# Patient Record
Sex: FEMALE | Race: WHITE | NOT HISPANIC OR LATINO | URBAN - METROPOLITAN AREA
[De-identification: names, ages, dates, MRNs, and addresses within clinical notes are randomized per-mention and may not be internally consistent; named-entity substitution may affect disease eponyms.]

---

## 2018-08-27 PROBLEM — Z00.00 ENCOUNTER FOR PREVENTIVE HEALTH EXAMINATION: Status: ACTIVE | Noted: 2018-08-27

## 2025-03-25 ENCOUNTER — EMERGENCY (EMERGENCY)
Facility: HOSPITAL | Age: 40
LOS: 1 days | Discharge: HOME | End: 2025-03-25
Admitting: EMERGENCY MEDICINE
Payer: MEDICAID

## 2025-03-25 VITALS
OXYGEN SATURATION: 98 % | TEMPERATURE: 97 F | RESPIRATION RATE: 18 BRPM | HEART RATE: 71 BPM | SYSTOLIC BLOOD PRESSURE: 146 MMHG | DIASTOLIC BLOOD PRESSURE: 106 MMHG | WEIGHT: 227.08 LBS

## 2025-03-25 VITALS
RESPIRATION RATE: 17 BRPM | HEART RATE: 69 BPM | TEMPERATURE: 98 F | OXYGEN SATURATION: 98 % | SYSTOLIC BLOOD PRESSURE: 136 MMHG | DIASTOLIC BLOOD PRESSURE: 71 MMHG

## 2025-03-25 PROCEDURE — 96372 THER/PROPH/DIAG INJ SC/IM: CPT

## 2025-03-25 PROCEDURE — 73564 X-RAY EXAM KNEE 4 OR MORE: CPT | Mod: 26,LT

## 2025-03-25 PROCEDURE — 99284 EMERGENCY DEPT VISIT MOD MDM: CPT | Mod: 25

## 2025-03-25 PROCEDURE — 93971 EXTREMITY STUDY: CPT | Mod: 26,LT

## 2025-03-25 PROCEDURE — 73564 X-RAY EXAM KNEE 4 OR MORE: CPT

## 2025-03-25 PROCEDURE — 99284 EMERGENCY DEPT VISIT MOD MDM: CPT

## 2025-03-25 PROCEDURE — 93971 EXTREMITY STUDY: CPT

## 2025-03-25 RX ORDER — KETOROLAC TROMETHAMINE 30 MG/ML
15 INJECTION, SOLUTION INTRAMUSCULAR; INTRAVENOUS ONCE
Refills: 0 | Status: DISCONTINUED | OUTPATIENT
Start: 2025-03-25 | End: 2025-03-25

## 2025-03-25 RX ADMIN — KETOROLAC TROMETHAMINE 15 MILLIGRAM(S): 30 INJECTION, SOLUTION INTRAMUSCULAR; INTRAVENOUS at 12:07

## 2025-03-25 RX ADMIN — KETOROLAC TROMETHAMINE 15 MILLIGRAM(S): 30 INJECTION, SOLUTION INTRAMUSCULAR; INTRAVENOUS at 13:05

## 2025-03-25 NOTE — ED ADULT TRIAGE NOTE - WEIGHT IN LBS
----- Message from Celena Boyd sent at 4/23/2018  9:15 AM EDT -----  Regarding: PLEASE RETURN CALL  Contact: [phone]  WENT TO ER.BREATHING DIFFICULTIES. BP /78,GOT BREATHING TREATMENTS, BUT WANTS ANOTHER BREATHING TREATMENT.WOULD RATHER TALK TO A NURSE TO SEE IF SHE SHOULD SCHEDULE NOW. PATIENT WAS CONFUSED WHEN I OFFERED  ER F/U APPT. CAN YOU PLEASE CALL HER AND SPEAK WITH HER, AND ADVISE APPT.[phone].THANKS,  CELENA   [phone]

## 2025-03-25 NOTE — ED PROVIDER NOTE - OBJECTIVE STATEMENT
40 F pmh arthritis, p/w LLE pain x this morning.  pt reports chronic L knee pain for which she is receiving gel injections in L knee for arthritis (last injection Nov 2024) and c/o worsening pain in L knee since this morning w/ radiation of pain down calf to foot; worse w/ ambulation.  occasional paresthesias in foot w/ certain movements knee.  no back pain or thigh pain.  uses cane for ambulation.  took excedrin w/o relief.  denies f/c, skin changes, chest pain, sob, calf swelling, h/o vte, exogenous hormones, recent travel/immobilization, trauma/fall.

## 2025-03-25 NOTE — ED PROVIDER NOTE - CHIEF COMPLAINT
[Time Spent: ___ minutes] : I have spent [unfilled] minutes of time on the encounter which excludes teaching and separately reported services. The patient is a 40y Female complaining of lower leg pain/injury.

## 2025-03-25 NOTE — ED ADULT NURSE NOTE - MUSCLE PAIN OR WEAKNESS
Caller would like to discuss a medication issue with a RN. Writer advised caller of callback within 24-72 hours.    Patient Name: Samaria A Watson  Caller Name: Elena, [employer]  Name of Facility: [facility]  Callback Number: [phone]  Fax Number:   Additional Info: Patient current needs a refill of her Solodyn medication.  Both Solodyn and it's generic, Minocycline, are not covered by patient's insurance.  Caller states one alternative that is covered is Dyacin.  If this is acceptable with the physician, please forward to patient's pharmacy (information below).    [facility] [address] [place] [address] [address] [place] [address] [address] [facility] [place] [facility] [place] [address]  Phone: [phone] Fax: [phone]    Did you confirm the message with the caller?: yes    Thank you,  Bushra Martines    
Dynacin Rx'd 2/2 note indicating that this is covered. However, [employer] is noting that it is not covered.     CC'ing [facility] pharmacist as well for clarification in case coverage remains an issue.     Ledy Taylor MD    
Minocycline 50 mg capsules are covered under patient's insurance plan for $10 per 30 day supply. If the current Rx that is entered is not covered. Consider changing to capsules.     Gwendolyn Mclaughlin RPH  07/27/17    
Noted. Thank you.     Ledy Taylor MD    
no

## 2025-03-25 NOTE — ED ADULT NURSE NOTE - LOCATION
Anesthesia present. Anesthesia will monitor and maintain: airway, IV access, sedation, medications, and vital signs. Refer to Anesthesia report for further documentation. leg

## 2025-03-25 NOTE — ED PROVIDER NOTE - NSFOLLOWUPINSTRUCTIONS_ED_ALL_ED_FT
Take tylenol 650mg or motrin 400-800mg as needed every 4-6 hours for pain.   REST- Rest your hurting/injured joint or extremity to decrease pain and swelling for 24-48 hours    ICE- Apply ice to area of pain to decreased inflammation and pain, put towel/barrier between ice and skin. You can keep ice on for 20 minutes at a time 4-8 times daily   COMPRESSION- Wear ace wrap or brace for support to reduce swelling.  Make sure not to wrap too tight, loosen if skin feeling numb/tingling or skin turns blue   ELEVATION- Elevate hurting/injured area 6 or more inches about level of heart to decrease swelling/inflammation.  Use pillow under joint to elevate area    Please call to arrange follow up with your orthopedic specialist within one week     Make sure to use knee immobilizer and you can use crutches or cane to walk, you can bear weight on left leg

## 2025-03-25 NOTE — ED PROVIDER NOTE - PHYSICAL EXAMINATION
Gen: well appearing, no acute distress  Skin: warm/dry, no rash noted  Resp: breathing comfortably, speaking in full sentences, no dyspnea  LLE: + ttp over lateral knee w/ small effusion, no erythema or warmth, pain w/ ROM knee but FROM all joints and able to SLR, mild ttp over upper calf, compartments soft, no edema, dp/pt pulse 2+, SILT distally, brisk cap refill   Neuro: alert/oriented, ambulatory

## 2025-03-25 NOTE — ED ADULT NURSE NOTE - NSFALLUNIVINTERV_ED_ALL_ED
Bed/Stretcher in lowest position, wheels locked, appropriate side rails in place/Call bell, personal items and telephone in reach/Instruct patient to call for assistance before getting out of bed/chair/stretcher/Non-slip footwear applied when patient is off stretcher/[facility] to call system/Physically safe environment - no spills, clutter or unnecessary equipment/Purposeful proactive rounding/Room/bathroom lighting operational, light cord in reach

## 2025-03-25 NOTE — ED ADULT NURSE NOTE - MODE OF DISCHARGE
Patient correctly returned demonstrated how to utilize crutches/Ambulatory with cane/crutches/walker

## 2025-03-25 NOTE — ED PROVIDER NOTE - PATIENT PORTAL LINK FT
You can access the FollowMyHealth Patient Portal offered by [facility] by registering at the following website: [email or web address]. By joining [employer]’s FollowMyHealth portal, you will also be able to view your health information using other applications (apps) compatible with our system.

## 2025-03-25 NOTE — ED PROVIDER NOTE - CLINICAL SUMMARY MEDICAL DECISION MAKING FREE TEXT BOX
40 F pmh arthritis, p/w LLE pain x this morning; from knee to foot.  no f/c, skin changes or trauma.  on exam bp elevated possible pain related, afebrile, LLE: + ttp over lateral knee w/ small effusion, no erythema or warmth, pain w/ ROM knee but FROM all joints and able to SLR, mild ttp over upper calf, compartments soft, no edema, dp/pt pulse 2+, SILT distally, brisk cap refill, antalgic gait.  suspect soft tissue injury/sprain vs radicular pain, r/o fx, r/o dvt.  no c/f septic joint or compartment syndrome.  will obtain doppler and xray knee.  toradol for pain 40 F pmh arthritis, p/w LLE pain x this morning; from knee to foot.  no f/c, skin changes or trauma.  on exam bp elevated possible pain related, afebrile, LLE: + ttp over lateral knee w/ small effusion, no erythema or warmth, pain w/ ROM knee but FROM all joints and able to SLR, mild ttp over upper calf, compartments soft, no edema, dp/pt pulse 2+, SILT distally, brisk cap refill, antalgic gait.  suspect soft tissue injury/sprain vs radicular pain, r/o fx, r/o dvt.  no c/f septic joint or compartment syndrome.  will obtain doppler and xray knee.  toradol for pain    pain improved w/ toradol.  no dvt on sono.  xray shows no acute fx or dislocation.  will place in knee immobilizer and give crutches for wbat.  has her own ortho to see outpt.  continue RICE.  discussed strict return parameters

## 2025-03-25 NOTE — ED ADULT NURSE NOTE - OBJECTIVE STATEMENT
Alert and orientedx4, presenting to ed ambulating with a cane with steady gait. Patient c/o chronic LLE pain, hx of arthritis. Denies recent injury or increase of swelling.

## 2025-03-27 DIAGNOSIS — M25.562 PAIN IN LEFT KNEE: ICD-10-CM

## 2025-03-27 DIAGNOSIS — M19.90 UNSPECIFIED OSTEOARTHRITIS, UNSPECIFIED SITE: ICD-10-CM

## 2025-03-27 DIAGNOSIS — G89.29 OTHER CHRONIC PAIN: ICD-10-CM

## 2025-03-27 DIAGNOSIS — Z88.0 ALLERGY STATUS TO PENICILLIN: ICD-10-CM
